# Patient Record
(demographics unavailable — no encounter records)

---

## 2024-10-28 NOTE — PROCEDURE
[FreeTextEntry1] : Patient was advised to continue formal PT because he is feeling good relief from it. A lengthy discussion with the patient regarding the current medication being prescribed, the dosage, frequency, and the need to be consistent with taking the medication at or about the same time every day. I also spent time reviewing the risks alternatives and benefits to the medication as well as the potential side effects. The decision was made to proceed with the medication Since the patient is currently taking non-steroidal anti-inflammatory medication I had a complete and thorough discussion with the patient regarding risks and benefits of these types of medications. The most common side effects include but are not limited to gastrointestinal upset, blood in the stool, nausea, diarrhea, as well as tinnitus. I have advised the patient that if they should experience any of the side effects that she discontinue them at once and contacted primary care physician for immediate followup. I did advise the patient that these types of medications can be taken on an as needed basis and if they are not having pain he should not take them. I also advised that the patient should follow the explicit instructions on the labile and not to exceed the recommended dosage. A complete and thorough evaluation of the type of shoes they should be wearing and type of shoes for this time of year was discussed with patient.  follow up appt 4 weeks

## 2024-10-28 NOTE — PHYSICAL EXAM
[Skin Color & Pigmentation] : normal skin color and pigmentation [Skin Turgor] : normal skin turgor [] : no rash [Skin Lesions] : no skin lesions [FreeTextEntry3] : Vascular exam reveals palpable pedal pulses, the foot is warm to touch, there was good capillary fill time, the skin is normal in appearance there is no evidence of vascular disease or compromise at this time [de-identified] : Evaluation of the posterior portion of the heel reveals pain upon palpation of the area of insertion of the Achilles tendon to the posterior aspect of the heel. 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 The pain is felt more lateral to medial in a classic Oren's deformity symptom [Foot Ulcer] : no foot ulcer [Skin Induration] : no skin induration

## 2024-10-28 NOTE — HISTORY OF PRESENT ILLNESS
[FreeTextEntry1] : Location: left distal AT pain Duration: 4-5 mos, no hx of injury or trauma Etiology: unknown Past Tx: none Exacerbated by: weight bearing Prior Hx: no  my recent treatment : lifts and PT ( 3-4 sessions) does see improvement

## 2024-10-28 NOTE — REASON FOR VISIT
[Follow-Up Visit] : a follow-up visit for [Achilles Tendinitis] : Achilles Tendinitis [FreeTextEntry2] : left

## 2024-11-18 NOTE — PROCEDURE
[FreeTextEntry1] : Patient was advised to continue formal PT because she is feeling good relief from it. Continue nsaids During the evaluation and management I had a lengthy discussion with the patient regarding benefits of functional foot orthoses. I explained to the patient the etiology and treatment options and one of them included the offloading and balancing of the painful portion of the foot. I explained the importance of balancing in offloading the painful area as part of the overall treatment process to advance healing. I have asked the patient to consider this as part of the treatment After a lengthy discussion with the patient regarding the possible benefits of orthotics and what we hope to achieve with them as it relates to their diagnosis the patient has agreed to be casted for the devices, The patient was then casted for a pair of custom functional foot orthoses with the subtalar joint in neutral, the forefoot locked, The patient was advised that they will be notified when the orthotics are returned from the laboratory, Should there be any questions or concerns they were advised to contact the office immediately, Educational literature regarding orthotics were dispensed, Included in the casting for orthotics was an overall gait exam and biomechanical evaluation

## 2024-11-18 NOTE — PHYSICAL EXAM
[Skin Color & Pigmentation] : normal skin color and pigmentation [Skin Turgor] : normal skin turgor [] : no rash [Skin Lesions] : no skin lesions [FreeTextEntry3] : Vascular exam reveals palpable pedal pulses, the foot is warm to touch, there was good capillary fill time, the skin is normal in appearance there is no evidence of vascular disease or compromise at this time [de-identified] : Evaluation of the posterior portion of the heel reveals less pain upon palpation of the area of insertion of the Achilles tendon to the posterior aspect of the heel.  [Foot Ulcer] : no foot ulcer [Skin Induration] : no skin induration

## 2024-11-18 NOTE — HISTORY OF PRESENT ILLNESS
[FreeTextEntry1] : Location: left distal AT pain Duration: 4-5 mos, no hx of injury or trauma Etiology: unknown Past Tx: none Exacerbated by: weight bearing Prior Hx: no  my recent treatment : Switching to Mobic has greatly decreased her pain and she has gone to approximately 6 physical therapy sessions and sees significant improvement Also requests a pair of functional foot orthoses as she states her insurance company will cover them

## 2025-01-15 NOTE — HISTORY OF PRESENT ILLNESS
[FreeTextEntry1] : Location: left distal AT pain Duration: 4-5 mos, no hx of injury or trauma Etiology: unknown Past Tx: none Exacerbated by: weight bearing Prior Hx: no  my recent treatment : Switching to Mobic has greatly decreased her pain and she has gone to approximately 6 physical therapy sessions and sees significant improvement   The patient presents today for evaluation, fitting and dispensing of custom made foot orthotics

## 2025-01-15 NOTE — PHYSICAL EXAM
[de-identified] : Evaluation of the posterior portion of the heel reveals less pain upon palpation of the area of insertion of the Achilles tendon to the posterior aspect of the heel.

## 2025-01-15 NOTE — PROCEDURE
[FreeTextEntry1] : The patient presents for dispensing of custom molded foot orthotics. I have removed the orthotics from the packaging and I have examined him. They do appear to be made as per my instructions regarding materials additions corrections. Upon placing him to the patient's foot they do appear to fit nicely. There is no material failure nor gapping. They were then trimmed to fit and placed inside the patient's shoe gear. There appears to be a good fit. Upon initial ambulation the patient has no initial complaints regarding pain off edges were tight fit. The patient did ambulate around the office for several minutes and had a favorable result. I then explained to the patient the normal break-in period. The paperwork supplied by the laboratory was reviewed with the patient. They understand a normal break-in period is to be gradual over several weeks. I've advised the patient that different, weird, and uncomfortable are certainly acceptable words in the beginning however pain, blister and callus are things that should not occur. Once the proper break-in was explained to the patient they were given an appointment to follow up.  follow up prn